# Patient Record
Sex: FEMALE | Race: WHITE | NOT HISPANIC OR LATINO | Employment: FULL TIME | URBAN - NONMETROPOLITAN AREA
[De-identification: names, ages, dates, MRNs, and addresses within clinical notes are randomized per-mention and may not be internally consistent; named-entity substitution may affect disease eponyms.]

---

## 2018-11-21 RX ORDER — SIMVASTATIN 10 MG
10 TABLET ORAL NIGHTLY
COMMUNITY

## 2018-11-21 RX ORDER — FERROUS SULFATE 325(65) MG
325 TABLET ORAL 2 TIMES DAILY
COMMUNITY

## 2018-11-21 RX ORDER — ASPIRIN 81 MG/1
81 TABLET ORAL DAILY
COMMUNITY

## 2018-11-21 RX ORDER — LEVOTHYROXINE SODIUM 0.05 MG/1
50 TABLET ORAL DAILY
COMMUNITY

## 2018-11-21 RX ORDER — OXYBUTYNIN CHLORIDE 5 MG/1
5 TABLET ORAL 2 TIMES DAILY
COMMUNITY

## 2018-11-26 ENCOUNTER — ANESTHESIA EVENT (OUTPATIENT)
Dept: GASTROENTEROLOGY | Facility: HOSPITAL | Age: 58
End: 2018-11-26

## 2018-11-26 ENCOUNTER — HOSPITAL ENCOUNTER (OUTPATIENT)
Facility: HOSPITAL | Age: 58
Setting detail: HOSPITAL OUTPATIENT SURGERY
Discharge: HOME | End: 2018-11-26
Attending: INTERNAL MEDICINE | Admitting: INTERNAL MEDICINE

## 2018-11-26 ENCOUNTER — ANESTHESIA (OUTPATIENT)
Dept: GASTROENTEROLOGY | Facility: HOSPITAL | Age: 58
End: 2018-11-26

## 2018-11-26 VITALS
HEART RATE: 75 BPM | SYSTOLIC BLOOD PRESSURE: 111 MMHG | WEIGHT: 125 LBS | OXYGEN SATURATION: 99 % | RESPIRATION RATE: 16 BRPM | HEIGHT: 66 IN | DIASTOLIC BLOOD PRESSURE: 67 MMHG | TEMPERATURE: 97.2 F | BODY MASS INDEX: 20.09 KG/M2

## 2018-11-26 PROCEDURE — 25010000002 PROPOFOL 10 MG/ML EMULSION: Performed by: NURSE ANESTHETIST, CERTIFIED REGISTERED

## 2018-11-26 RX ORDER — LIDOCAINE HYDROCHLORIDE 10 MG/ML
INJECTION, SOLUTION INFILTRATION; PERINEURAL AS NEEDED
Status: DISCONTINUED | OUTPATIENT
Start: 2018-11-26 | End: 2018-11-26 | Stop reason: SURG

## 2018-11-26 RX ORDER — PROPOFOL 10 MG/ML
VIAL (ML) INTRAVENOUS AS NEEDED
Status: DISCONTINUED | OUTPATIENT
Start: 2018-11-26 | End: 2018-11-26 | Stop reason: SURG

## 2018-11-26 RX ORDER — DEXTROSE AND SODIUM CHLORIDE 5; .45 G/100ML; G/100ML
30 INJECTION, SOLUTION INTRAVENOUS CONTINUOUS
Status: DISCONTINUED | OUTPATIENT
Start: 2018-11-26 | End: 2018-11-26 | Stop reason: HOSPADM

## 2018-11-26 RX ADMIN — PROPOFOL 30 MG: 10 INJECTION, EMULSION INTRAVENOUS at 13:27

## 2018-11-26 RX ADMIN — DEXTROSE AND SODIUM CHLORIDE 30 ML/HR: 5; 450 INJECTION, SOLUTION INTRAVENOUS at 12:36

## 2018-11-26 RX ADMIN — PROPOFOL 100 MG: 10 INJECTION, EMULSION INTRAVENOUS at 13:23

## 2018-11-26 RX ADMIN — LIDOCAINE HYDROCHLORIDE 50 MG: 10 INJECTION, SOLUTION INFILTRATION; PERINEURAL at 13:23

## 2018-11-26 RX ADMIN — PROPOFOL 20 MG: 10 INJECTION, EMULSION INTRAVENOUS at 13:35

## 2018-11-26 RX ADMIN — PROPOFOL 30 MG: 10 INJECTION, EMULSION INTRAVENOUS at 13:31

## 2018-11-26 NOTE — ANESTHESIA POSTPROCEDURE EVALUATION
Patient: Lis Gavin    Procedure Summary     Date:  11/26/18 Room / Location:  [facility] ENDOSCOPY 2 / [facility] ENDOSCOPY    Anesthesia Start:  1323 Anesthesia Stop:  1343    Procedure:  COLONOSCOPY (N/A ) Diagnosis:       Encounter for screening colonoscopy      (Encounter for screening colonoscopy [Z12.11])    Surgeon:  West Forde DO Provider:  Janes Schreiber CRNA    Anesthesia Type:  MAC ASA Status:  2          Anesthesia Type: MAC  Last vitals  BP   139/85 (11/26/18 1226)   Temp   98.1 °F (36.7 °C) (11/26/18 1226)   Pulse   117 (11/26/18 1226)   Resp   20 (11/26/18 1226)     SpO2   95 % (11/26/18 1226)     Post Anesthesia Care and Evaluation    Patient location during evaluation: bedside  Patient participation: complete - patient participated  Level of consciousness: awake and alert  Pain score: 0  Pain management: adequate  Airway patency: patent  Anesthetic complications: No anesthetic complications  PONV Status: none  Cardiovascular status: acceptable  Respiratory status: acceptable  Hydration status: acceptable

## 2018-11-26 NOTE — ANESTHESIA PREPROCEDURE EVALUATION
Anesthesia Evaluation     Patient summary reviewed and Nursing notes reviewed   NPO Solid Status: > 8 hours  NPO Liquid Status: > 2 hours           Airway   Mallampati: II  TM distance: >3 FB  Neck ROM: full  No difficulty expected  Dental - normal exam     Pulmonary - negative pulmonary ROS and normal exam   Cardiovascular     Rhythm: regular  Rate: normal        Neuro/Psych- negative ROS  GI/Hepatic/Renal/Endo - negative ROS     Musculoskeletal (-) negative ROS    Abdominal    Substance History - negative use     OB/GYN          Other - negative ROS                       Anesthesia Plan    ASA 2     MAC     intravenous induction   Anesthetic plan, all risks, benefits, and alternatives have been provided, discussed and informed consent has been obtained with: patient.    Plan discussed with CRNA.

## 2018-11-26 NOTE — H&P
Tyler Arango DO,[facility]  Gastroenterology  Hepatology  Endoscopy  Board Certified in Internal Medicine and gastroenterology  [address], [address]  [place]. [address]  - [phone]   F - [phone]     GASTROENTEROLOGY HISTORY AND PHYSICAL  NOTE   TYLER ARANGO DO.         SUBJECTIVE:   11/26/2018    Name: Lis Gavin  DOD: 1960        Chief Complaint:       Subjective : Screening for colon cancer    Patient is 58 y.o. female presents with desire for elective colonoscopy.      ROS/HISTORY/ CURRENT MEDICATIONS/OBJECTIVE/VS/PE:   Review of Systems:  All systems unremarkable unless specified below.  Constitutional   HENT  Eyes   Respiratory    Cardiovascular  Gastrointestinal   Endocrine  Genitourinary    Musculoskeletal   Skin  Allergic/Immunologic    Neurological    Hematological  Psychiatric/Behavioral    History:     Past Medical History:   Diagnosis Date   • Elevated cholesterol      Past Surgical History:   Procedure Laterality Date   • NEPHRECTOMY Left      History reviewed. No pertinent family history.  Social History     Tobacco Use   • Smoking status: Former Smoker   • Smokeless tobacco: Never Used   Substance Use Topics   • Alcohol use: No     Frequency: Never   • Drug use: No     Medications Prior to Admission   Medication Sig Dispense Refill Last Dose   • ferrous sulfate 325 (65 FE) MG tablet Take 325 mg by mouth 2 (Two) Times a Day.   11/24/2018   • levothyroxine (SYNTHROID, LEVOTHROID) 50 MCG tablet Take 50 mcg by mouth Daily.   11/26/2018 at Unknown time   • oxybutynin (DITROPAN) 5 MG tablet Take 5 mg by mouth 2 (Two) Times a Day.   11/25/2018 at Unknown time   • simvastatin (ZOCOR) 10 MG tablet Take 10 mg by mouth Every Night.   11/24/2018   • aspirin 81 MG EC tablet Take 81 mg by mouth Daily.   More than a month at Unknown time     Allergies:  Bactrim [sulfamethoxazole-trimethoprim]    I have reviewed the patients medical history, surgical history and family  history in the available medical record system.     Current Medications:     Current Facility-Administered Medications   Medication Dose Route Frequency Provider Last Rate Last Dose   • dextrose 5 % and sodium chloride 0.45 % infusion  30 mL/hr Intravenous Continuous West Forde DO 30 mL/hr at 11/26/18 1236 30 mL/hr at 11/26/18 1236       Objective     Physical Exam:   Temp:  [98.1 °F (36.7 °C)] 98.1 °F (36.7 °C)  Heart Rate:  [117] 117  Resp:  [20] 20  BP: (139)/(85) 139/85    Physical Exam:  General Appearance:    Alert, cooperative, in no acute distress   Head:    Normocephalic, without obvious abnormality, atraumatic   Eyes:            Lids and lashes normal, conjunctivae and sclerae normal, no   icterus, no pallor, corneas clear, PERRLA   Ears:    Ears appear intact with no abnormalities noted   Throat:   No oral lesions, no thrush, oral mucosa moist   Neck:   No adenopathy, supple, trachea midline, no thyromegaly, no     carotid bruit, no JVD   Back:     No kyphosis present, no scoliosis present, no skin lesions,       erythema or scars, no tenderness to percussion or                   palpation,   range of motion normal   Lungs:     Clear to auscultation,respirations regular, even and                   unlabored    Heart:    Regular rhythm and normal rate, normal S1 and S2, no            murmur, no gallop, no rub, no click   Breast Exam:    Deferred   Abdomen:     Normal bowel sounds, no masses, no organomegaly, soft        non-tender, non-distended, no guarding, no rebound                 tenderness   Genitalia:    Deferred   Extremities:   Moves all extremities well, no edema, no cyanosis, no              redness   Pulses:   Pulses palpable and equal bilaterally   Skin:   No bleeding, bruising or rash   Lymph nodes:   No palpable adenopathy   Neurologic:   Cranial nerves 2 - 12 grossly intact, sensation intact, DTR        present and equal bilaterally      Results Review:     No results found for:  WBC, HGB, HCT, PLT          No results found for: LIPASE  No results found for: INR       Radiology Review:  Imaging Results (last 72 hours)     ** No results found for the last 72 hours. **           I reviewed the patient's new clinical results.  I reviewed the patient's new imaging results and agree with the interpretation.     ASSESSMENT/PLAN:   ASSESSMENT:   1.  Screening for colon cancer    PLAN:   1.  Colonoscopy    Risk and benefits associated with the procedure are reviewed with the patient.  The patient wishes to proceed      West Forde DO  11/26/18  1:21 PM

## 2019-12-16 NOTE — DISCHARGE INSTRUCTIONS
Outpatient Instructions for Monitored Anesthesia Care (MAC)    1. You will be released from the hospital in the care of a responsible adult who should remain with you for at least 6 hours.    2. You are at an increased risk for falls following anesthesia. Use care when changing from a lying to a sitting position. Use your assistive devices ( example: cane, walker or family member).    3. You must NOT drive a car, climb high places such as a ladder, or operate equipment such as electric knives,stoves etc...for at least 12 hours. If you are dizzy for longer than 24 hours, notify your doctor.    4. DO NOT drink any alcoholic beverages for at least 24 hours. Anesthesia may impair your judgement.    5. If you smoke, do not smoke alone due to increased risk of burns/fires.    6. DO NOT undertake any legally binding commitment for at least 24 hours. Anesthesia may impair your judgement.    reviewed ultrasound results, lab work, urine, ekg reviewed, GI referral, copy of results printed for pt, pt explained d/c instructions

## (undated) DEVICE — CANN SMPL SOFTECH BIFLO ETCO2 A/M 7FT